# Patient Record
Sex: MALE | URBAN - METROPOLITAN AREA
[De-identification: names, ages, dates, MRNs, and addresses within clinical notes are randomized per-mention and may not be internally consistent; named-entity substitution may affect disease eponyms.]

---

## 2018-07-24 NOTE — PATIENT DISCUSSION
Discussed pneumatic retinopexy versus Vit/SB versus vitrectomy/EL/GAS for repair of retinal detachment.  Will return in 2 days for additional laser tx.  Bubble discussed at length, no flying with bubble.  Head position reviewed and demonstrated.  Start Maxitrol gtts.  All questions answered, risk of PVR/RT/RD discussed.  May require future surgical intervention, goal of tx discussed.

## 2018-07-24 NOTE — PROCEDURE NOTE: CLINICAL
PROCEDURE NOTE: Pneumatic Retinopexy #1 OD. Prior to procedure, risks/benefits/alternatives discussed including infection, loss of vision, hemorrhage, cataract, glaucoma, retinal tears or detachment and patient wished to proceed. Betadine prep was performed. Intravitreal injection of 0.5 ccof 100% SF6 given 4mm from limbus in ST quadrant. Patient tolerated procedure well. There were no complications. Post-op instructions given. Patient given office phone number/answering service number and advised to call immediately should there be an increase in floaters or redness, loss of vision or pain, or should they have any other questions or concerns. ***SIGNED PAPER CONSENT***.

## 2018-07-26 NOTE — PROCEDURE NOTE: CLINICAL
PROCEDURE NOTE: Repair of Retinal Detachment, 1 or More Sessions #1 OD. Prior to laser, risks/benefits/alternatives to laser discussed including loss of vision, decreased peripheral and night vision, need for more laser and/or surgery and patient wished to proceed. A written consent is on file, and the need for today’s laser was discussed and the patient is understanding and wishes to proceed. Laser Lens: *. Wavelength: Argon Green. Spot size: * um. Pulse power: * mW. Number of pulses: *. Patient tolerated procedure well. There were no complications. Post-op instructions given. Patient given office phone number/answering service number and advised to call immediately should there be loss of vision or pain, or should they have any other questions or concerns. Alba Macedo

## 2018-07-26 NOTE — PATIENT DISCUSSION
gas bubble in place, rec repair of RD laser to treat tear today(continuation of MD tx).  Keep bracelet on while bubble in, still no flying with bubble.  CPM's.  As discussed before risk of PVR/new RT/RD developing requiring additional surgical intervention.

## 2018-07-26 NOTE — PATIENT DISCUSSION
Patient understands condition, prognosis and need for follow up care.  Follow with [facility].

## 2018-07-27 NOTE — PROCEDURE NOTE: CLINICAL
PROCEDURE NOTE: Repair of Retinal Detachment, 1 or More Sessions #2 OD. Prior to laser, risks/benefits/alternatives to laser discussed including loss of vision, decreased peripheral and night vision, need for more laser and/or surgery and patient wished to proceed. A written consent is on file, and the need for today’s laser was discussed and the patient is understanding and wishes to proceed. Laser Lens: *. Wavelength: Argon Green. Spot size: * um. Pulse power: * mW. Number of pulses: *. Patient tolerated procedure well. There were no complications. Post-op instructions given. Patient given office phone number/answering service number and advised to call immediately should there be loss of vision or pain, or should they have any other questions or concerns. Maren Sosa

## 2018-08-07 NOTE — PATIENT DISCUSSION
Good PO appearance, IOP within normal limits.  No gas bubble remains. The arc that she sees is from the lasering that was done.  Explained her brain will adapt to it and will not be as noticeable.

## 2018-08-21 NOTE — PATIENT DISCUSSION
Good PO appearance, IOP within normal limits.  No gas bubble remains. The arc that she sees is from the lasering that was done.

## 2018-08-21 NOTE — PATIENT DISCUSSION
Expect that vitreous opacities will resolve with time but if not consider Vit sx, stressed progression of cataract with vit sx so not rec at this time.

## 2020-05-26 NOTE — PATIENT DISCUSSION
Expect that vitreous opacities will resolve with time but if not consider Vit sx, stressed progression of cataract and increased risk due to hx of RD with vit sx so not rec at this time.

## 2020-05-26 NOTE — PATIENT DISCUSSION
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 This is often associated with myopic degeneration but can also be found in nonmyopic patients. Approximately 1% of patients with lattice degeneration will progress to symptomatic retinal detachment at some point. Most patients with lattice degeneration with or without atrophic holes can be observed without treatment. Indications for treatment include retinal detachment in the fellow eye, strong family history of retinal detachment, sudden onset of photopsias, open breaks with subretinal fluid, or extensive vitreal retinal traction. Most patients do not require intervention.  All of the findings were discussed in detail including the possibility of progressive retinal thinning/detachment, dilated pupil, photopsia, difficulty focusing and permanent vision loss with the patient. Retinal detachment warning signs were discussed.

## 2020-05-26 NOTE — PATIENT DISCUSSION
Worsening but visual acuity still in good range and membrane is not too significant at this time.  Consider Vit/MP if worsens but cautious due to hx of RD.

## 2021-03-22 ENCOUNTER — IMPORTED ENCOUNTER (OUTPATIENT)
Dept: URBAN - METROPOLITAN AREA CLINIC 9 | Facility: CLINIC | Age: 71
End: 2021-03-22

## 2021-05-19 ENCOUNTER — IMPORTED ENCOUNTER (OUTPATIENT)
Dept: URBAN - METROPOLITAN AREA CLINIC 9 | Facility: CLINIC | Age: 71
End: 2021-05-19

## 2021-09-07 NOTE — PATIENT DISCUSSION
Worsening but visual acuity still good and membrane is not too significant at this time.  Consider Vit/MP if worsens but cautious due to hx of RD.

## 2021-10-15 ASSESSMENT — TONOMETRY
OD_IOP_MMHG: 18
OS_IOP_MMHG: 17
OS_IOP_MMHG: 14

## 2021-10-15 ASSESSMENT — VISUAL ACUITY
OD_CC: 20/30 + SN
OS_CC: 20/30 - SN
OS_CC: 20/20 - SN
OD_CC: 20/20 SN

## 2022-02-25 NOTE — PATIENT DISCUSSION
Reassured patient no new holes, tears or RD seen. Patient is leaving for the [place] and wanted to make sure there was not a tear.

## 2022-08-05 NOTE — PATIENT DISCUSSION
5/17/22:  Order Photo and OCT RNFL today.  OCT shows decreased RNFL OS, order VF 24-2.  If change on VF compared to previous test, discussed starting gtts.

## 2022-09-13 NOTE — PATIENT DISCUSSION
The patient is cleared from the retinal perspective to proceed with cataract extraction at their preference.